# Patient Record
(demographics unavailable — no encounter records)

---

## 2025-06-24 NOTE — DISCUSSION/SUMMARY
[FreeTextEntry1] : Reassurance. Normal ECG, cardiac exam, and echo from 3 yrs ago. Briefer palps sound consistent with premature beats, and potential triggers were discussed. Additional testing, including monitoring given infrequency of episodes, of low yield at this time. Follow up advised if symptoms increase in frequency or severity. No cardiac contraindications to endoscopic GI procedures under general anesthesia. [EKG obtained to assist in diagnosis and management of assessed problem(s)] : EKG obtained to assist in diagnosis and management of assessed problem(s)

## 2025-06-24 NOTE — REASON FOR VISIT
[Symptom and Test Evaluation] : symptom and test evaluation [Other: ____] : [unfilled] [Parent] : parent [FreeTextEntry3] : Dr Juan Watson

## 2025-06-24 NOTE — HISTORY OF PRESENT ILLNESS
[FreeTextEntry1] : EWA JAMIL is a 19 year old female presents for eval due to symptoms of palpitations and also needs cardiac clearance for EGD by Dr Juan Watson. She has complaints of palpitations for past few months.  Two types of palps - one is very brief sensation of double beat with associated feeling SOB for some time afterwards, but no dizziness.  This last occurred about 1 month ago.  The other palpitation is rapid palpitations with certain activities, last occurred about 1 week ago. She ice-skates competitively and notes some RAMAN. Echocardiogram report from 3 years ago reviewed.  Soc - non-smoker Fam hist - grandparent aortic aneurysm